# Patient Record
Sex: FEMALE | Race: BLACK OR AFRICAN AMERICAN | NOT HISPANIC OR LATINO | Employment: FULL TIME | URBAN - METROPOLITAN AREA
[De-identification: names, ages, dates, MRNs, and addresses within clinical notes are randomized per-mention and may not be internally consistent; named-entity substitution may affect disease eponyms.]

---

## 2017-11-15 RX ORDER — NISOLDIPINE 25.5 MG/1
25.5 TABLET, FILM COATED, EXTENDED RELEASE ORAL DAILY
COMMUNITY

## 2017-11-15 RX ORDER — HYDROCHLOROTHIAZIDE 12.5 MG/1
12.5 CAPSULE, GELATIN COATED ORAL DAILY
COMMUNITY

## 2017-11-15 RX ORDER — VENLAFAXINE 75 MG/1
75 TABLET ORAL 4 TIMES DAILY
COMMUNITY

## 2017-11-16 ENCOUNTER — ANESTHESIA EVENT (OUTPATIENT)
Dept: PERIOP | Facility: HOSPITAL | Age: 62
End: 2017-11-16

## 2017-11-16 ENCOUNTER — HOSPITAL ENCOUNTER (OUTPATIENT)
Facility: HOSPITAL | Age: 62
Setting detail: HOSPITAL OUTPATIENT SURGERY
Discharge: HOME | End: 2017-11-16
Attending: OPHTHALMOLOGY | Admitting: OPHTHALMOLOGY

## 2017-11-16 ENCOUNTER — ANESTHESIA (OUTPATIENT)
Dept: PERIOP | Facility: HOSPITAL | Age: 62
End: 2017-11-16

## 2017-11-16 VITALS
TEMPERATURE: 98.5 F | DIASTOLIC BLOOD PRESSURE: 82 MMHG | WEIGHT: 181 LBS | OXYGEN SATURATION: 100 % | HEART RATE: 79 BPM | BODY MASS INDEX: 30.9 KG/M2 | RESPIRATION RATE: 16 BRPM | SYSTOLIC BLOOD PRESSURE: 170 MMHG | HEIGHT: 64 IN

## 2017-11-16 DIAGNOSIS — H57.9 LESION OF EYE: ICD-10-CM

## 2017-11-16 LAB
ANION GAP SERPL CALCULATED.3IONS-SCNC: 15.1 MMOL/L
BUN BLD-MCNC: 13 MG/DL (ref 8–23)
BUN/CREAT SERPL: 21.3 (ref 7–25)
CALCIUM SPEC-SCNC: 9.6 MG/DL (ref 8.6–10.5)
CHLORIDE SERPL-SCNC: 102 MMOL/L (ref 98–107)
CO2 SERPL-SCNC: 20.9 MMOL/L (ref 22–29)
CREAT BLD-MCNC: 0.61 MG/DL (ref 0.57–1)
GFR SERPL CREATININE-BSD FRML MDRD: 120 ML/MIN/1.73
GLUCOSE BLD-MCNC: 103 MG/DL (ref 65–99)
POTASSIUM BLD-SCNC: 4 MMOL/L (ref 3.5–5.2)
SODIUM BLD-SCNC: 138 MMOL/L (ref 136–145)

## 2017-11-16 PROCEDURE — 25010000002 FENTANYL CITRATE (PF) 100 MCG/2ML SOLUTION: Performed by: ANESTHESIOLOGY

## 2017-11-16 PROCEDURE — 80048 BASIC METABOLIC PNL TOTAL CA: CPT | Performed by: ANESTHESIOLOGY

## 2017-11-16 PROCEDURE — 88305 TISSUE EXAM BY PATHOLOGIST: CPT | Performed by: OPHTHALMOLOGY

## 2017-11-16 PROCEDURE — 25010000002 MIDAZOLAM PER 1 MG: Performed by: ANESTHESIOLOGY

## 2017-11-16 PROCEDURE — 93010 ELECTROCARDIOGRAM REPORT: CPT | Performed by: INTERNAL MEDICINE

## 2017-11-16 PROCEDURE — 93005 ELECTROCARDIOGRAM TRACING: CPT | Performed by: ANESTHESIOLOGY

## 2017-11-16 PROCEDURE — 25010000002 PROPOFOL 10 MG/ML EMULSION: Performed by: NURSE ANESTHETIST, CERTIFIED REGISTERED

## 2017-11-16 PROCEDURE — 25010000002 ONDANSETRON PER 1 MG: Performed by: NURSE ANESTHETIST, CERTIFIED REGISTERED

## 2017-11-16 RX ORDER — PROMETHAZINE HYDROCHLORIDE 25 MG/ML
12.5 INJECTION, SOLUTION INTRAMUSCULAR; INTRAVENOUS ONCE AS NEEDED
Status: DISCONTINUED | OUTPATIENT
Start: 2017-11-16 | End: 2017-11-16 | Stop reason: HOSPADM

## 2017-11-16 RX ORDER — PROMETHAZINE HYDROCHLORIDE 25 MG/1
25 SUPPOSITORY RECTAL ONCE AS NEEDED
Status: DISCONTINUED | OUTPATIENT
Start: 2017-11-16 | End: 2017-11-16 | Stop reason: HOSPADM

## 2017-11-16 RX ORDER — FENTANYL CITRATE 50 UG/ML
50 INJECTION, SOLUTION INTRAMUSCULAR; INTRAVENOUS
Status: DISCONTINUED | OUTPATIENT
Start: 2017-11-16 | End: 2017-11-16 | Stop reason: HOSPADM

## 2017-11-16 RX ORDER — MAGNESIUM HYDROXIDE 1200 MG/15ML
LIQUID ORAL AS NEEDED
Status: DISCONTINUED | OUTPATIENT
Start: 2017-11-16 | End: 2017-11-16 | Stop reason: HOSPADM

## 2017-11-16 RX ORDER — DIPHENHYDRAMINE HYDROCHLORIDE 50 MG/ML
12.5 INJECTION INTRAMUSCULAR; INTRAVENOUS
Status: DISCONTINUED | OUTPATIENT
Start: 2017-11-16 | End: 2017-11-16 | Stop reason: HOSPADM

## 2017-11-16 RX ORDER — FLUMAZENIL 0.1 MG/ML
0.2 INJECTION INTRAVENOUS AS NEEDED
Status: DISCONTINUED | OUTPATIENT
Start: 2017-11-16 | End: 2017-11-16 | Stop reason: HOSPADM

## 2017-11-16 RX ORDER — FAMOTIDINE 10 MG/ML
20 INJECTION, SOLUTION INTRAVENOUS ONCE
Status: COMPLETED | OUTPATIENT
Start: 2017-11-16 | End: 2017-11-16

## 2017-11-16 RX ORDER — SPIRONOLACTONE 50 MG/1
50 TABLET, FILM COATED ORAL DAILY
COMMUNITY

## 2017-11-16 RX ORDER — PROPOFOL 10 MG/ML
VIAL (ML) INTRAVENOUS AS NEEDED
Status: DISCONTINUED | OUTPATIENT
Start: 2017-11-16 | End: 2017-11-16 | Stop reason: SURG

## 2017-11-16 RX ORDER — EPHEDRINE SULFATE 50 MG/ML
5 INJECTION, SOLUTION INTRAVENOUS ONCE AS NEEDED
Status: DISCONTINUED | OUTPATIENT
Start: 2017-11-16 | End: 2017-11-16 | Stop reason: HOSPADM

## 2017-11-16 RX ORDER — HYDRALAZINE HYDROCHLORIDE 20 MG/ML
5 INJECTION INTRAMUSCULAR; INTRAVENOUS
Status: DISCONTINUED | OUTPATIENT
Start: 2017-11-16 | End: 2017-11-16 | Stop reason: HOSPADM

## 2017-11-16 RX ORDER — LIDOCAINE HYDROCHLORIDE 20 MG/ML
INJECTION, SOLUTION INFILTRATION; PERINEURAL AS NEEDED
Status: DISCONTINUED | OUTPATIENT
Start: 2017-11-16 | End: 2017-11-16 | Stop reason: SURG

## 2017-11-16 RX ORDER — LABETALOL HYDROCHLORIDE 5 MG/ML
5 INJECTION, SOLUTION INTRAVENOUS
Status: DISCONTINUED | OUTPATIENT
Start: 2017-11-16 | End: 2017-11-16 | Stop reason: HOSPADM

## 2017-11-16 RX ORDER — OXYCODONE AND ACETAMINOPHEN 7.5; 325 MG/1; MG/1
1 TABLET ORAL ONCE AS NEEDED
Status: DISCONTINUED | OUTPATIENT
Start: 2017-11-16 | End: 2017-11-16 | Stop reason: HOSPADM

## 2017-11-16 RX ORDER — LABETALOL HYDROCHLORIDE 5 MG/ML
INJECTION, SOLUTION INTRAVENOUS AS NEEDED
Status: DISCONTINUED | OUTPATIENT
Start: 2017-11-16 | End: 2017-11-16 | Stop reason: SURG

## 2017-11-16 RX ORDER — ERYTHROMYCIN 5 MG/G
OINTMENT OPHTHALMIC AS NEEDED
Status: DISCONTINUED | OUTPATIENT
Start: 2017-11-16 | End: 2017-11-16 | Stop reason: HOSPADM

## 2017-11-16 RX ORDER — HYDROCODONE BITARTRATE AND ACETAMINOPHEN 7.5; 325 MG/1; MG/1
1 TABLET ORAL ONCE AS NEEDED
Status: DISCONTINUED | OUTPATIENT
Start: 2017-11-16 | End: 2017-11-16 | Stop reason: HOSPADM

## 2017-11-16 RX ORDER — PROMETHAZINE HYDROCHLORIDE 25 MG/1
12.5 TABLET ORAL ONCE AS NEEDED
Status: DISCONTINUED | OUTPATIENT
Start: 2017-11-16 | End: 2017-11-16 | Stop reason: HOSPADM

## 2017-11-16 RX ORDER — HYDROMORPHONE HYDROCHLORIDE 1 MG/ML
0.5 INJECTION, SOLUTION INTRAMUSCULAR; INTRAVENOUS; SUBCUTANEOUS
Status: DISCONTINUED | OUTPATIENT
Start: 2017-11-16 | End: 2017-11-16 | Stop reason: HOSPADM

## 2017-11-16 RX ORDER — ONDANSETRON 2 MG/ML
4 INJECTION INTRAMUSCULAR; INTRAVENOUS ONCE AS NEEDED
Status: DISCONTINUED | OUTPATIENT
Start: 2017-11-16 | End: 2017-11-16 | Stop reason: HOSPADM

## 2017-11-16 RX ORDER — MIDAZOLAM HYDROCHLORIDE 1 MG/ML
1 INJECTION INTRAMUSCULAR; INTRAVENOUS
Status: DISCONTINUED | OUTPATIENT
Start: 2017-11-16 | End: 2017-11-16 | Stop reason: HOSPADM

## 2017-11-16 RX ORDER — ECHINACEA PURPUREA EXTRACT 125 MG
TABLET ORAL AS NEEDED
Status: DISCONTINUED | OUTPATIENT
Start: 2017-11-16 | End: 2017-11-16 | Stop reason: HOSPADM

## 2017-11-16 RX ORDER — PROMETHAZINE HYDROCHLORIDE 25 MG/1
25 TABLET ORAL ONCE AS NEEDED
Status: DISCONTINUED | OUTPATIENT
Start: 2017-11-16 | End: 2017-11-16 | Stop reason: HOSPADM

## 2017-11-16 RX ORDER — BUSPIRONE HYDROCHLORIDE 15 MG/1
15 TABLET ORAL DAILY PRN
COMMUNITY

## 2017-11-16 RX ORDER — SODIUM CHLORIDE 0.9 % (FLUSH) 0.9 %
1-10 SYRINGE (ML) INJECTION AS NEEDED
Status: DISCONTINUED | OUTPATIENT
Start: 2017-11-16 | End: 2017-11-16 | Stop reason: HOSPADM

## 2017-11-16 RX ORDER — ONDANSETRON 2 MG/ML
INJECTION INTRAMUSCULAR; INTRAVENOUS AS NEEDED
Status: DISCONTINUED | OUTPATIENT
Start: 2017-11-16 | End: 2017-11-16 | Stop reason: SURG

## 2017-11-16 RX ORDER — HYDROCODONE BITARTRATE AND ACETAMINOPHEN 5; 325 MG/1; MG/1
1 TABLET ORAL ONCE AS NEEDED
Status: DISCONTINUED | OUTPATIENT
Start: 2017-11-16 | End: 2017-11-16 | Stop reason: HOSPADM

## 2017-11-16 RX ORDER — ROCURONIUM BROMIDE 10 MG/ML
INJECTION, SOLUTION INTRAVENOUS AS NEEDED
Status: DISCONTINUED | OUTPATIENT
Start: 2017-11-16 | End: 2017-11-16 | Stop reason: SURG

## 2017-11-16 RX ORDER — SCOLOPAMINE TRANSDERMAL SYSTEM 1 MG/1
1 PATCH, EXTENDED RELEASE TRANSDERMAL
Status: DISCONTINUED | OUTPATIENT
Start: 2017-11-16 | End: 2017-11-16 | Stop reason: HOSPADM

## 2017-11-16 RX ORDER — HYDROCODONE BITARTRATE AND ACETAMINOPHEN 5; 325 MG/1; MG/1
1 TABLET ORAL EVERY 6 HOURS PRN
Qty: 15 TABLET | Refills: 0 | Status: SHIPPED | OUTPATIENT
Start: 2017-11-16

## 2017-11-16 RX ORDER — ESTRADIOL 0.05 MG/D
1 FILM, EXTENDED RELEASE TRANSDERMAL 2 TIMES WEEKLY
COMMUNITY

## 2017-11-16 RX ORDER — SODIUM CHLORIDE, SODIUM LACTATE, POTASSIUM CHLORIDE, CALCIUM CHLORIDE 600; 310; 30; 20 MG/100ML; MG/100ML; MG/100ML; MG/100ML
9 INJECTION, SOLUTION INTRAVENOUS CONTINUOUS
Status: DISCONTINUED | OUTPATIENT
Start: 2017-11-16 | End: 2017-11-16 | Stop reason: HOSPADM

## 2017-11-16 RX ORDER — MIDAZOLAM HYDROCHLORIDE 1 MG/ML
2 INJECTION INTRAMUSCULAR; INTRAVENOUS
Status: DISCONTINUED | OUTPATIENT
Start: 2017-11-16 | End: 2017-11-16 | Stop reason: HOSPADM

## 2017-11-16 RX ORDER — NALOXONE HCL 0.4 MG/ML
0.2 VIAL (ML) INJECTION AS NEEDED
Status: DISCONTINUED | OUTPATIENT
Start: 2017-11-16 | End: 2017-11-16 | Stop reason: HOSPADM

## 2017-11-16 RX ADMIN — LIDOCAINE HYDROCHLORIDE 40 MG: 20 INJECTION, SOLUTION INFILTRATION; PERINEURAL at 12:15

## 2017-11-16 RX ADMIN — MIDAZOLAM 1 MG: 1 INJECTION INTRAMUSCULAR; INTRAVENOUS at 11:07

## 2017-11-16 RX ADMIN — PROPOFOL 200 MG: 10 INJECTION, EMULSION INTRAVENOUS at 12:15

## 2017-11-16 RX ADMIN — ROCURONIUM BROMIDE 30 MG: 10 INJECTION INTRAVENOUS at 12:17

## 2017-11-16 RX ADMIN — FENTANYL CITRATE 50 MCG: 50 INJECTION INTRAMUSCULAR; INTRAVENOUS at 13:47

## 2017-11-16 RX ADMIN — HYDROCODONE BITARTRATE AND ACETAMINOPHEN 1 TABLET: 5; 325 TABLET ORAL at 14:29

## 2017-11-16 RX ADMIN — FENTANYL CITRATE 50 MCG: 50 INJECTION INTRAMUSCULAR; INTRAVENOUS at 13:27

## 2017-11-16 RX ADMIN — SCOPALAMINE 1 PATCH: 1 PATCH, EXTENDED RELEASE TRANSDERMAL at 11:02

## 2017-11-16 RX ADMIN — SODIUM CHLORIDE, POTASSIUM CHLORIDE, SODIUM LACTATE AND CALCIUM CHLORIDE 9 ML/HR: 600; 310; 30; 20 INJECTION, SOLUTION INTRAVENOUS at 10:51

## 2017-11-16 RX ADMIN — LABETALOL HYDROCHLORIDE 10 MG: 5 INJECTION, SOLUTION INTRAVENOUS at 12:47

## 2017-11-16 RX ADMIN — MIDAZOLAM 1 MG: 1 INJECTION INTRAMUSCULAR; INTRAVENOUS at 11:02

## 2017-11-16 RX ADMIN — FENTANYL CITRATE 50 MCG: 50 INJECTION INTRAMUSCULAR; INTRAVENOUS at 12:13

## 2017-11-16 RX ADMIN — SUGAMMADEX 200 MG: 100 INJECTION, SOLUTION INTRAVENOUS at 12:42

## 2017-11-16 RX ADMIN — SODIUM CHLORIDE, POTASSIUM CHLORIDE, SODIUM LACTATE AND CALCIUM CHLORIDE: 600; 310; 30; 20 INJECTION, SOLUTION INTRAVENOUS at 12:09

## 2017-11-16 RX ADMIN — ONDANSETRON 4 MG: 2 INJECTION INTRAMUSCULAR; INTRAVENOUS at 12:24

## 2017-11-16 RX ADMIN — FAMOTIDINE 20 MG: 10 INJECTION, SOLUTION INTRAVENOUS at 11:01

## 2017-11-16 NOTE — PLAN OF CARE
Problem: Perioperative Period (Adult)  Goal: Signs and Symptoms of Listed Potential Problems Will be Absent or Manageable (Perioperative Period)  Outcome: Outcome(s) achieved Date Met:  11/16/17 11/16/17 1528   Perioperative Period   Problems Assessed (Perioperative Period) all   Problems Present (Perioperative Period) none

## 2017-11-16 NOTE — PLAN OF CARE
Problem: Patient Care Overview (Adult)  Goal: Plan of Care Review  Outcome: Ongoing (interventions implemented as appropriate)    11/16/17 0936   Coping/Psychosocial Response Interventions   Plan Of Care Reviewed With patient   Patient Care Overview   Progress improving       Goal: Adult Individualization and Mutuality  Outcome: Ongoing (interventions implemented as appropriate)  Goal: Discharge Needs Assessment  Outcome: Ongoing (interventions implemented as appropriate)    11/16/17 0936   Discharge Needs Assessment   Concerns To Be Addressed no discharge needs identified         Problem: Perioperative Period (Adult)  Goal: Signs and Symptoms of Listed Potential Problems Will be Absent or Manageable (Perioperative Period)  Outcome: Ongoing (interventions implemented as appropriate)    11/16/17 0936   Perioperative Period   Problems Assessed (Perioperative Period) all   Problems Present (Perioperative Period) none

## 2017-11-16 NOTE — ANESTHESIA POSTPROCEDURE EVALUATION
Patient: Philomena Nguyen    Procedure Summary     Date Anesthesia Start Anesthesia Stop Room / Location    11/16/17 1209 1257  [facility] /  [facility]       Procedure Diagnosis Surgeon Provider    LEFT PLASTIC REPAIR OF CANALICULUS EXCISION OF CONJUNCTIVAL LESION LEFT EYE PROBE AND IRRIGATION WITH JAMES TUBES (Left Eye); LEFT JAMES TUBE (Left Eye) No diagnosis on file. MD Krishan Cavanaugh MD          Anesthesia Type: general  Last vitals  BP   139/67 (11/16/17 1400)   Temp   36.4 °C (97.6 °F) (11/16/17 1256)   Pulse   73 (11/16/17 1400)   Resp   16 (11/16/17 1400)     SpO2   100 % (11/16/17 1400)     Post Anesthesia Care and Evaluation    Patient location during evaluation: PACU  Patient participation: complete - patient participated  Level of consciousness: awake and alert  Pain management: adequate  Airway patency: patent  Anesthetic complications: No anesthetic complications    Cardiovascular status: acceptable  Respiratory status: acceptable  Hydration status: acceptable    Comments: --------------------            11/16/17               1400     --------------------   BP:       139/67     Pulse:      73       Resp:       16       Temp:                SpO2:      100%     --------------------

## 2017-11-16 NOTE — PLAN OF CARE
Problem: Patient Care Overview (Adult)  Goal: Plan of Care Review  Outcome: Outcome(s) achieved Date Met:  11/16/17 11/16/17 1528   Coping/Psychosocial Response Interventions   Plan Of Care Reviewed With patient;family   Patient Care Overview   Progress improving       Goal: Adult Individualization and Mutuality  Outcome: Outcome(s) achieved Date Met:  11/16/17  Goal: Discharge Needs Assessment  Outcome: Outcome(s) achieved Date Met:  11/16/17 11/16/17 1528   Discharge Needs Assessment   Concerns To Be Addressed no discharge needs identified

## 2017-11-16 NOTE — ANESTHESIA PROCEDURE NOTES
Airway  Urgency: elective    Airway not difficult    General Information and Staff    Patient location during procedure: OR  Anesthesiologist: RABIA NUNEZ  CRNA: LEE POP    Indications and Patient Condition  Indications for airway management: airway protection    Preoxygenated: yes  Mask difficulty assessment: 1 - vent by mask    Final Airway Details  Final airway type: endotracheal airway      Successful airway: ETT  Cuffed: yes   Successful intubation technique: direct laryngoscopy  Endotracheal tube insertion site: oral  Blade: Tootie  Blade size: #3  ETT size: 7.0 mm  Cormack-Lehane Classification: grade I - full view of glottis  Placement verified by: chest auscultation and capnometry   Measured from: lips  ETT to lips (cm): 21  Number of attempts at approach: 1    Additional Comments  Smooth IV/mask induction/intubation. Easy bag-mask ventilation. Orally intubated, easy, atraumatic, lips/teeth/mouth left intact, as preop. Direct visual of vocal cords. +ETCO2, bilateral breath sounds and equal.

## 2017-11-16 NOTE — ANESTHESIA PREPROCEDURE EVALUATION
Anesthesia Evaluation     Patient summary reviewed and Nursing notes reviewed          Airway   Mallampati: II  no difficulty expected  Dental      Pulmonary - negative pulmonary ROS   Cardiovascular - negative cardio ROS    ECG reviewed        Neuro/Psych- negative ROS  GI/Hepatic/Renal/Endo - negative ROS     Musculoskeletal (-) negative ROS    Abdominal    Substance History - negative use     OB/GYN negative ob/gyn ROS         Other                                        Anesthesia Plan    ASA 2     general     intravenous induction   Anesthetic plan and risks discussed with patient.

## 2017-11-17 LAB
CYTO UR: NORMAL
LAB AP CASE REPORT: NORMAL
Lab: NORMAL
PATH REPORT.FINAL DX SPEC: NORMAL
PATH REPORT.GROSS SPEC: NORMAL

## 2017-11-21 NOTE — OP NOTE
OPERATIVE NOTE    Patient Identification:  Name: Philomena Nguyen  Age: 62 y.o.  Sex: female  :  1955  MRN: [id number]                                               Preoperative diagnosis: left Nasolacrimal duct obstruction, left conjunctival lesion, left punctal stenosis  Postoperative diagnosis: same  Procedure: Left conjunctival lesion excision <1cm, left plastic repair of canaliculus  Surgeon: Dr. Elizalde who was present and scrubbed throughout all critical portions of the operation  Assistants: none  Anesthesia: General  EBL: less than 50cc  Specimens:   Order Name Source Comment Collection Info Order Time   BASIC METABOLIC PANEL   Collected By: Rowena Harris RN 2017 10:10 AM   TISSUE PATHOLOGY EXAM Eyelid, Left  Collected By: Sujit Elizalde MD 2017 12:34 PM       Description of the procedure:   The patient was taken to the operating room and placed on the table in the supine position, where anesthesia was induced. 2% lidocaine with epinephrine and 0.5% marcaine in a 1:1 fashion was injected over the surgical site, and the patient was prepped and draped in the usual manner for orbitofacial surgery.     Corneal protectors were placed in both eyes. Afrin soaked cottonoids were placed in the ipsilateral nasal vault.    The previous fibrotic punctal plug at the orifice of the inferior punctal opening could not be removed easily.  Multiple different forceps were used to grasp the plug but it would not release without force.  A decision to remove the plug from the surrounding granuloma and punctal site with sharp tip scissors was performed.  AFter removal, the plug and surrounding lesion were sent for pathology.      Attention was then turned to the plastic repair of canaliculus.     A one-snip punctal incision was made through the upper and lower puncti on the right side.  The canaliculus appeared fibrotic and blocked at the proximal opening.  I decided to perform a full thickness lid  margin incision to find the open end of the canalicular tissue.  Once found I introducted the villar tube through the punctal opening and into the visualized distal end of the canalicular laceration.   I was then able to intubate the canalicular sytem in normal fashion.. The obstruction was passed with gentle pressure until the probe entered the nose under the inferior turbinate. Villar tubing was inserted through the upper and lower canaliculi, through the nasal lacrimal duct, and into the nose, where they were tied in 3 half knots.  I closed the lacerated canalicular site with two 8-0 vicryl sutures to bring the canalicular tissue together.  The lid margin was repaired with 6-0 vicryl suture and the skin was closed with 5-0 plain gut suture.    The corneal protectors were removed and antibiotic ophthalmic ointment was placed over the surgical site.     The patient was then awakened and taken from the operating room in good condition, having tolerated the procedure well. There were no complications, and the estimated blood loss was less than 50 cc.

## 2017-12-15 ENCOUNTER — APPOINTMENT (OUTPATIENT)
Dept: WOMENS IMAGING | Facility: HOSPITAL | Age: 62
End: 2017-12-15

## 2017-12-15 PROCEDURE — 77067 SCR MAMMO BI INCL CAD: CPT | Performed by: RADIOLOGY

## 2018-12-21 ENCOUNTER — APPOINTMENT (OUTPATIENT)
Dept: WOMENS IMAGING | Facility: HOSPITAL | Age: 63
End: 2018-12-21

## 2018-12-21 PROCEDURE — 77063 BREAST TOMOSYNTHESIS BI: CPT | Performed by: RADIOLOGY

## 2018-12-21 PROCEDURE — 77067 SCR MAMMO BI INCL CAD: CPT | Performed by: RADIOLOGY

## 2020-05-13 ENCOUNTER — APPOINTMENT (OUTPATIENT)
Dept: WOMENS IMAGING | Facility: HOSPITAL | Age: 65
End: 2020-05-13

## 2020-05-13 PROCEDURE — 77063 BREAST TOMOSYNTHESIS BI: CPT | Performed by: RADIOLOGY

## 2020-05-13 PROCEDURE — 77067 SCR MAMMO BI INCL CAD: CPT | Performed by: RADIOLOGY

## 2021-03-22 ENCOUNTER — BULK ORDERING (OUTPATIENT)
Dept: CASE MANAGEMENT | Facility: OTHER | Age: 66
End: 2021-03-22

## 2021-03-22 DIAGNOSIS — Z23 IMMUNIZATION DUE: ICD-10-CM

## 2021-06-08 ENCOUNTER — APPOINTMENT (OUTPATIENT)
Dept: WOMENS IMAGING | Facility: HOSPITAL | Age: 66
End: 2021-06-08

## 2021-06-08 PROCEDURE — 77063 BREAST TOMOSYNTHESIS BI: CPT | Performed by: RADIOLOGY

## 2021-06-08 PROCEDURE — 77067 SCR MAMMO BI INCL CAD: CPT | Performed by: RADIOLOGY

## 2022-08-25 ENCOUNTER — APPOINTMENT (OUTPATIENT)
Dept: WOMENS IMAGING | Facility: HOSPITAL | Age: 67
End: 2022-08-25

## 2022-08-25 PROCEDURE — 77067 SCR MAMMO BI INCL CAD: CPT | Performed by: RADIOLOGY

## 2022-08-25 PROCEDURE — 77063 BREAST TOMOSYNTHESIS BI: CPT | Performed by: RADIOLOGY

## 2022-09-23 ENCOUNTER — APPOINTMENT (OUTPATIENT)
Dept: WOMENS IMAGING | Facility: HOSPITAL | Age: 67
End: 2022-09-23

## 2022-09-23 PROCEDURE — 76642 ULTRASOUND BREAST LIMITED: CPT | Performed by: RADIOLOGY

## 2022-09-23 PROCEDURE — 77066 DX MAMMO INCL CAD BI: CPT | Performed by: RADIOLOGY

## 2022-09-23 PROCEDURE — G0279 TOMOSYNTHESIS, MAMMO: HCPCS | Performed by: RADIOLOGY

## (undated) DEVICE — ELECTRD BLD EZ CLN MOD 2.5IN

## (undated) DEVICE — SUT VIC 5/0 P3 18IN J493G

## (undated) DEVICE — SUT GUT PLN FAST ABS 5/0 PC1 18IN 1915G

## (undated) DEVICE — PK ENT 40

## (undated) DEVICE — WIPE INST MEROCEL

## (undated) DEVICE — CROUCH CORNEAL PROTECTOR: Brand: BAUSCH + LOMB

## (undated) DEVICE — BLD SCLPL BEAVR MINI STR 2BVL 180D LF

## (undated) DEVICE — GLV SURG BIOGEL SENSR LTX PF SZ7.5

## (undated) DEVICE — 1010 S-DRAPE TOWEL DRAPE 10/BX: Brand: STERI-DRAPE™

## (undated) DEVICE — CODMAN® SURGICAL PATTIES 1/2" X 3" (1.27CM X 7.62CM): Brand: CODMAN®

## (undated) DEVICE — IMMOB HD UNIV CLR DISP

## (undated) DEVICE — SUT VIC 7/0 TG140/8 18IN J546G

## (undated) DEVICE — SWABSTK SKINPREP PVPI LF PK/3

## (undated) DEVICE — STERILE COTTON TIP 6IN 10PK: Brand: MEDLINE

## (undated) DEVICE — NDL HYPO PRECISIONGLIDE REG 25G 1 1/2